# Patient Record
Sex: FEMALE | Race: WHITE | Employment: FULL TIME | URBAN - METROPOLITAN AREA
[De-identification: names, ages, dates, MRNs, and addresses within clinical notes are randomized per-mention and may not be internally consistent; named-entity substitution may affect disease eponyms.]

---

## 2023-03-17 ENCOUNTER — TRANSCRIBE ORDERS (OUTPATIENT)
Dept: SCHEDULING | Age: 53
End: 2023-03-17

## 2023-03-17 DIAGNOSIS — Z12.31 ENCOUNTER FOR SCREENING MAMMOGRAM FOR MALIGNANT NEOPLASM OF BREAST: Primary | ICD-10-CM

## 2023-03-20 ENCOUNTER — HOSPITAL ENCOUNTER (OUTPATIENT)
Dept: MAMMOGRAPHY | Age: 53
Discharge: HOME | End: 2023-03-23
Admitting: REGISTERED NURSE
Payer: COMMERCIAL

## 2023-03-20 DIAGNOSIS — Z12.31 ENCOUNTER FOR SCREENING MAMMOGRAM FOR MALIGNANT NEOPLASM OF BREAST: ICD-10-CM

## 2023-03-20 PROCEDURE — 77063 BREAST TOMOSYNTHESIS BI: CPT

## 2024-08-23 ENCOUNTER — OFFICE VISIT (OUTPATIENT)
Dept: ORTHOPEDIC SURGERY | Age: 54
End: 2024-08-23

## 2024-08-23 ENCOUNTER — TELEPHONE (OUTPATIENT)
Dept: ORTHOPEDIC SURGERY | Age: 54
End: 2024-08-23

## 2024-08-23 ENCOUNTER — HOSPITAL ENCOUNTER (INPATIENT)
Age: 54
LOS: 2 days | Discharge: HOME | DRG: 272 | End: 2024-08-25
Attending: FAMILY MEDICINE | Admitting: INTERNAL MEDICINE
Payer: COMMERCIAL

## 2024-08-23 ENCOUNTER — HOSPITAL ENCOUNTER (EMERGENCY)
Age: 54
Discharge: ANOTHER HOSPITAL | End: 2024-08-23
Attending: EMERGENCY MEDICINE
Payer: COMMERCIAL

## 2024-08-23 VITALS
HEIGHT: 67 IN | TEMPERATURE: 97.9 F | BODY MASS INDEX: 36.88 KG/M2 | HEART RATE: 88 BPM | OXYGEN SATURATION: 98 % | SYSTOLIC BLOOD PRESSURE: 184 MMHG | RESPIRATION RATE: 16 BRPM | WEIGHT: 235 LBS | DIASTOLIC BLOOD PRESSURE: 101 MMHG

## 2024-08-23 VITALS — BODY MASS INDEX: 36.88 KG/M2 | WEIGHT: 235 LBS | HEIGHT: 67 IN

## 2024-08-23 DIAGNOSIS — I82.431 ACUTE DEEP VEIN THROMBOSIS (DVT) OF POPLITEAL VEIN OF RIGHT LOWER EXTREMITY (HCC): ICD-10-CM

## 2024-08-23 DIAGNOSIS — M25.561 RIGHT KNEE PAIN, UNSPECIFIED CHRONICITY: Primary | ICD-10-CM

## 2024-08-23 DIAGNOSIS — R60.0 BILATERAL LEG EDEMA: ICD-10-CM

## 2024-08-23 DIAGNOSIS — I82.491 ACUTE DEEP VEIN THROMBOSIS (DVT) OF OTHER SPECIFIED VEIN OF RIGHT LOWER EXTREMITY (HCC): ICD-10-CM

## 2024-08-23 DIAGNOSIS — I82.411 ACUTE DEEP VEIN THROMBOSIS (DVT) OF FEMORAL VEIN OF RIGHT LOWER EXTREMITY (HCC): Primary | ICD-10-CM

## 2024-08-23 DIAGNOSIS — R60.0 LEG EDEMA, RIGHT: ICD-10-CM

## 2024-08-23 PROBLEM — N87.0 CERVICAL INTRAEPITHELIAL NEOPLASIA GRADE 1: Status: ACTIVE | Noted: 2023-06-22

## 2024-08-23 PROBLEM — R87.612 LOW GRADE SQUAMOUS INTRAEPITHELIAL LESION (LGSIL) ON PAPANICOLAOU SMEAR OF CERVIX: Status: ACTIVE | Noted: 2023-03-29

## 2024-08-23 PROBLEM — N92.1 EXCESSIVE MENSTRUATION WITH IRREGULAR CYCLE: Status: ACTIVE | Noted: 2023-06-20

## 2024-08-23 PROBLEM — E66.9 OBESITY: Status: ACTIVE | Noted: 2024-08-22

## 2024-08-23 PROBLEM — I82.413 ACUTE BILATERAL DEEP VEIN THROMBOSIS (DVT) OF FEMORAL VEINS (HCC): Status: ACTIVE | Noted: 2024-08-23

## 2024-08-23 PROBLEM — Z80.49 FAMILY HISTORY OF MALIGNANT NEOPLASM OF ENDOMETRIUM: Status: ACTIVE | Noted: 2023-06-20

## 2024-08-23 PROBLEM — R87.810 HIGH-RISK HUMAN PAPILLOMAVIRUS (HPV) DNA DETECTED IN CERVICAL SPECIMEN: Status: ACTIVE | Noted: 2023-04-30

## 2024-08-23 LAB
ALBUMIN SERPL-MCNC: 3.2 G/DL (ref 3.5–5)
ALBUMIN/GLOB SERPL: 0.9 (ref 1–1.9)
ALP SERPL-CCNC: 57 U/L (ref 35–104)
ALT SERPL-CCNC: 24 U/L (ref 12–65)
ANION GAP SERPL CALC-SCNC: 10 MMOL/L (ref 9–18)
APTT PPP: 29.8 SEC (ref 23.3–37.4)
AST SERPL-CCNC: 20 U/L (ref 15–37)
BASOPHILS # BLD: 0 K/UL (ref 0–0.2)
BASOPHILS NFR BLD: 1 % (ref 0–2)
BILIRUB SERPL-MCNC: 0.2 MG/DL (ref 0–1.2)
BUN SERPL-MCNC: 9 MG/DL (ref 6–23)
CALCIUM SERPL-MCNC: 8.9 MG/DL (ref 8.8–10.2)
CHLORIDE SERPL-SCNC: 102 MMOL/L (ref 98–107)
CO2 SERPL-SCNC: 27 MMOL/L (ref 20–28)
CREAT SERPL-MCNC: 0.82 MG/DL (ref 0.6–1.1)
DIFFERENTIAL METHOD BLD: NORMAL
EOSINOPHIL # BLD: 0.1 K/UL (ref 0–0.8)
EOSINOPHIL NFR BLD: 2 % (ref 0.5–7.8)
ERYTHROCYTE [DISTWIDTH] IN BLOOD BY AUTOMATED COUNT: 12.8 % (ref 11.9–14.6)
GLOBULIN SER CALC-MCNC: 3.7 G/DL (ref 2.3–3.5)
GLUCOSE SERPL-MCNC: 119 MG/DL (ref 70–99)
HCT VFR BLD AUTO: 37.4 % (ref 35.8–46.3)
HGB BLD-MCNC: 12.7 G/DL (ref 11.7–15.4)
IMM GRANULOCYTES # BLD AUTO: 0 K/UL (ref 0–0.5)
IMM GRANULOCYTES NFR BLD AUTO: 0 % (ref 0–5)
INR PPP: 1.1
LYMPHOCYTES # BLD: 1.8 K/UL (ref 0.5–4.6)
LYMPHOCYTES NFR BLD: 28 % (ref 13–44)
MCH RBC QN AUTO: 29.3 PG (ref 26.1–32.9)
MCHC RBC AUTO-ENTMCNC: 34 G/DL (ref 31.4–35)
MCV RBC AUTO: 86.4 FL (ref 82–102)
MONOCYTES # BLD: 0.4 K/UL (ref 0.1–1.3)
MONOCYTES NFR BLD: 7 % (ref 4–12)
NEUTS SEG # BLD: 4.1 K/UL (ref 1.7–8.2)
NEUTS SEG NFR BLD: 63 % (ref 43–78)
NRBC # BLD: 0 K/UL (ref 0–0.2)
PLATELET # BLD AUTO: 177 K/UL (ref 150–450)
PMV BLD AUTO: 10.7 FL (ref 9.4–12.3)
POTASSIUM SERPL-SCNC: 3.8 MMOL/L (ref 3.5–5.1)
PROT SERPL-MCNC: 6.8 G/DL (ref 6.3–8.2)
PROTHROMBIN TIME: 14.1 SEC (ref 11.3–14.9)
RBC # BLD AUTO: 4.33 M/UL (ref 4.05–5.2)
SODIUM SERPL-SCNC: 139 MMOL/L (ref 136–145)
UFH PPP CHRO-ACNC: 0.31 IU/ML (ref 0.3–0.7)
UFH PPP CHRO-ACNC: <0.1 IU/ML (ref 0.3–0.7)
WBC # BLD AUTO: 6.5 K/UL (ref 4.3–11.1)

## 2024-08-23 PROCEDURE — 6360000002 HC RX W HCPCS

## 2024-08-23 PROCEDURE — 36415 COLL VENOUS BLD VENIPUNCTURE: CPT

## 2024-08-23 PROCEDURE — 2580000003 HC RX 258: Performed by: INTERNAL MEDICINE

## 2024-08-23 PROCEDURE — 85520 HEPARIN ASSAY: CPT

## 2024-08-23 PROCEDURE — 96365 THER/PROPH/DIAG IV INF INIT: CPT

## 2024-08-23 PROCEDURE — 85730 THROMBOPLASTIN TIME PARTIAL: CPT

## 2024-08-23 PROCEDURE — 85025 COMPLETE CBC W/AUTO DIFF WBC: CPT

## 2024-08-23 PROCEDURE — 96366 THER/PROPH/DIAG IV INF ADDON: CPT

## 2024-08-23 PROCEDURE — 1100000000 HC RM PRIVATE

## 2024-08-23 PROCEDURE — 99285 EMERGENCY DEPT VISIT HI MDM: CPT

## 2024-08-23 PROCEDURE — 6360000002 HC RX W HCPCS: Performed by: INTERNAL MEDICINE

## 2024-08-23 PROCEDURE — 80053 COMPREHEN METABOLIC PANEL: CPT

## 2024-08-23 PROCEDURE — 6370000000 HC RX 637 (ALT 250 FOR IP): Performed by: PHYSICIAN ASSISTANT

## 2024-08-23 PROCEDURE — 85610 PROTHROMBIN TIME: CPT

## 2024-08-23 PROCEDURE — 96375 TX/PRO/DX INJ NEW DRUG ADDON: CPT

## 2024-08-23 RX ORDER — HEPARIN SODIUM 1000 [USP'U]/ML
80 INJECTION, SOLUTION INTRAVENOUS; SUBCUTANEOUS ONCE
Status: DISCONTINUED | OUTPATIENT
Start: 2024-08-23 | End: 2024-08-23

## 2024-08-23 RX ORDER — NORETHINDRONE AND ETHINYL ESTRADIOL 0.5-0.035
1 KIT ORAL DAILY
Status: ON HOLD | COMMUNITY
Start: 2024-07-10 | End: 2024-08-25 | Stop reason: HOSPADM

## 2024-08-23 RX ORDER — POTASSIUM CHLORIDE 7.45 MG/ML
10 INJECTION INTRAVENOUS PRN
Status: DISCONTINUED | OUTPATIENT
Start: 2024-08-23 | End: 2024-08-25 | Stop reason: HOSPADM

## 2024-08-23 RX ORDER — SODIUM CHLORIDE 0.9 % (FLUSH) 0.9 %
5-40 SYRINGE (ML) INJECTION EVERY 12 HOURS SCHEDULED
Status: DISCONTINUED | OUTPATIENT
Start: 2024-08-23 | End: 2024-08-25 | Stop reason: HOSPADM

## 2024-08-23 RX ORDER — ACETAMINOPHEN 650 MG/1
650 SUPPOSITORY RECTAL EVERY 6 HOURS PRN
Status: DISCONTINUED | OUTPATIENT
Start: 2024-08-23 | End: 2024-08-25 | Stop reason: HOSPADM

## 2024-08-23 RX ORDER — HEPARIN SODIUM 10000 [USP'U]/100ML
5-30 INJECTION, SOLUTION INTRAVENOUS CONTINUOUS
Status: DISCONTINUED | OUTPATIENT
Start: 2024-08-23 | End: 2024-08-25

## 2024-08-23 RX ORDER — HEPARIN SODIUM 10000 [USP'U]/100ML
5-30 INJECTION, SOLUTION INTRAVENOUS CONTINUOUS
Status: DISCONTINUED | OUTPATIENT
Start: 2024-08-23 | End: 2024-08-23 | Stop reason: HOSPADM

## 2024-08-23 RX ORDER — HEPARIN SODIUM 1000 [USP'U]/ML
80 INJECTION, SOLUTION INTRAVENOUS; SUBCUTANEOUS PRN
Status: DISCONTINUED | OUTPATIENT
Start: 2024-08-23 | End: 2024-08-23 | Stop reason: HOSPADM

## 2024-08-23 RX ORDER — HEPARIN SODIUM 1000 [USP'U]/ML
40 INJECTION, SOLUTION INTRAVENOUS; SUBCUTANEOUS PRN
Status: DISCONTINUED | OUTPATIENT
Start: 2024-08-23 | End: 2024-08-23 | Stop reason: HOSPADM

## 2024-08-23 RX ORDER — SODIUM CHLORIDE 9 MG/ML
INJECTION, SOLUTION INTRAVENOUS PRN
Status: DISCONTINUED | OUTPATIENT
Start: 2024-08-23 | End: 2024-08-25 | Stop reason: HOSPADM

## 2024-08-23 RX ORDER — ONDANSETRON 2 MG/ML
4 INJECTION INTRAMUSCULAR; INTRAVENOUS EVERY 6 HOURS PRN
Status: DISCONTINUED | OUTPATIENT
Start: 2024-08-23 | End: 2024-08-25 | Stop reason: HOSPADM

## 2024-08-23 RX ORDER — MELOXICAM 15 MG/1
15 TABLET ORAL DAILY
Qty: 30 TABLET | Refills: 2 | Status: ON HOLD | OUTPATIENT
Start: 2024-08-23 | End: 2024-08-24

## 2024-08-23 RX ORDER — ACETAMINOPHEN 325 MG/1
650 TABLET ORAL EVERY 6 HOURS PRN
Status: DISCONTINUED | OUTPATIENT
Start: 2024-08-23 | End: 2024-08-25 | Stop reason: HOSPADM

## 2024-08-23 RX ORDER — HYDROCHLOROTHIAZIDE 12.5 MG/1
12.5 TABLET ORAL DAILY
COMMUNITY

## 2024-08-23 RX ORDER — HYDRALAZINE HYDROCHLORIDE 20 MG/ML
10 INJECTION INTRAMUSCULAR; INTRAVENOUS EVERY 6 HOURS PRN
Status: DISCONTINUED | OUTPATIENT
Start: 2024-08-23 | End: 2024-08-25 | Stop reason: HOSPADM

## 2024-08-23 RX ORDER — HEPARIN SODIUM 1000 [USP'U]/ML
80 INJECTION, SOLUTION INTRAVENOUS; SUBCUTANEOUS ONCE
Status: COMPLETED | OUTPATIENT
Start: 2024-08-23 | End: 2024-08-23

## 2024-08-23 RX ORDER — LORAZEPAM 0.5 MG/1
0.5 TABLET ORAL EVERY 8 HOURS PRN
Status: DISCONTINUED | OUTPATIENT
Start: 2024-08-23 | End: 2024-08-25 | Stop reason: HOSPADM

## 2024-08-23 RX ORDER — OXYCODONE HYDROCHLORIDE 5 MG/1
5 TABLET ORAL EVERY 4 HOURS PRN
Status: DISCONTINUED | OUTPATIENT
Start: 2024-08-23 | End: 2024-08-23 | Stop reason: HOSPADM

## 2024-08-23 RX ORDER — HYDRALAZINE HYDROCHLORIDE 20 MG/ML
10 INJECTION INTRAMUSCULAR; INTRAVENOUS EVERY 6 HOURS PRN
Status: DISCONTINUED | OUTPATIENT
Start: 2024-08-23 | End: 2024-08-23 | Stop reason: HOSPADM

## 2024-08-23 RX ORDER — SODIUM CHLORIDE 9 MG/ML
INJECTION, SOLUTION INTRAVENOUS CONTINUOUS
Status: DISCONTINUED | OUTPATIENT
Start: 2024-08-23 | End: 2024-08-24

## 2024-08-23 RX ORDER — HEPARIN SODIUM 1000 [USP'U]/ML
80 INJECTION, SOLUTION INTRAVENOUS; SUBCUTANEOUS PRN
Status: DISCONTINUED | OUTPATIENT
Start: 2024-08-23 | End: 2024-08-25

## 2024-08-23 RX ORDER — ONDANSETRON 4 MG/1
4 TABLET, ORALLY DISINTEGRATING ORAL EVERY 8 HOURS PRN
Status: DISCONTINUED | OUTPATIENT
Start: 2024-08-23 | End: 2024-08-25 | Stop reason: HOSPADM

## 2024-08-23 RX ORDER — MAGNESIUM SULFATE IN WATER 40 MG/ML
2000 INJECTION, SOLUTION INTRAVENOUS PRN
Status: DISCONTINUED | OUTPATIENT
Start: 2024-08-23 | End: 2024-08-25 | Stop reason: HOSPADM

## 2024-08-23 RX ORDER — POLYETHYLENE GLYCOL 3350 17 G/17G
17 POWDER, FOR SOLUTION ORAL DAILY PRN
Status: DISCONTINUED | OUTPATIENT
Start: 2024-08-23 | End: 2024-08-25 | Stop reason: HOSPADM

## 2024-08-23 RX ORDER — SODIUM CHLORIDE 0.9 % (FLUSH) 0.9 %
5-40 SYRINGE (ML) INJECTION PRN
Status: DISCONTINUED | OUTPATIENT
Start: 2024-08-23 | End: 2024-08-25 | Stop reason: HOSPADM

## 2024-08-23 RX ORDER — HEPARIN SODIUM 1000 [USP'U]/ML
40 INJECTION, SOLUTION INTRAVENOUS; SUBCUTANEOUS PRN
Status: DISCONTINUED | OUTPATIENT
Start: 2024-08-23 | End: 2024-08-25

## 2024-08-23 RX ORDER — POTASSIUM CHLORIDE 1500 MG/1
40 TABLET, EXTENDED RELEASE ORAL PRN
Status: DISCONTINUED | OUTPATIENT
Start: 2024-08-23 | End: 2024-08-25 | Stop reason: HOSPADM

## 2024-08-23 RX ORDER — ACETAMINOPHEN 325 MG/1
650 TABLET ORAL EVERY 4 HOURS PRN
Status: DISCONTINUED | OUTPATIENT
Start: 2024-08-23 | End: 2024-08-23 | Stop reason: SDUPTHER

## 2024-08-23 RX ADMIN — SODIUM CHLORIDE, PRESERVATIVE FREE 10 ML: 5 INJECTION INTRAVENOUS at 20:12

## 2024-08-23 RX ADMIN — HEPARIN SODIUM 8500 UNITS: 1000 INJECTION INTRAVENOUS; SUBCUTANEOUS at 15:53

## 2024-08-23 RX ADMIN — HEPARIN SODIUM 18 UNITS/KG/HR: 10000 INJECTION, SOLUTION INTRAVENOUS at 15:52

## 2024-08-23 RX ADMIN — LORAZEPAM 0.5 MG: 0.5 TABLET ORAL at 23:19

## 2024-08-23 RX ADMIN — HYDRALAZINE HYDROCHLORIDE 10 MG: 20 INJECTION, SOLUTION INTRAMUSCULAR; INTRAVENOUS at 18:21

## 2024-08-23 ASSESSMENT — PAIN SCALES - GENERAL
PAINLEVEL_OUTOF10: 3
PAINLEVEL_OUTOF10: 0

## 2024-08-23 ASSESSMENT — PAIN DESCRIPTION - LOCATION: LOCATION: LEG

## 2024-08-23 ASSESSMENT — PAIN DESCRIPTION - ORIENTATION: ORIENTATION: RIGHT

## 2024-08-23 ASSESSMENT — PAIN - FUNCTIONAL ASSESSMENT: PAIN_FUNCTIONAL_ASSESSMENT: 0-10

## 2024-08-23 ASSESSMENT — LIFESTYLE VARIABLES
HOW OFTEN DO YOU HAVE A DRINK CONTAINING ALCOHOL: NEVER
HOW MANY STANDARD DRINKS CONTAINING ALCOHOL DO YOU HAVE ON A TYPICAL DAY: PATIENT DOES NOT DRINK

## 2024-08-23 NOTE — H&P
Hospitalist History and Physical   Admit Date:  No admission date for patient encounter.   Name:  Gricel Mtz   Age:  54 y.o.  Sex:  female  :  1970   MRN:  [id number]   Room:  [facility]    Presenting/Chief Complaint: No chief complaint on file.     Reason(s) for Admission: Acute bilateral deep vein thrombosis (DVT) of femoral veins (HCC) [I82.413]     History of Present Illness:   Gricel Mtz is a 54 y.o. female with no significant past medical history presenting after positive DVT study performed as an outpatient.  Patient states she was having some pain in her right calf for the past 4 days and has noted significant swelling in her right leg compared to left.  Emergency department discussed case with interventional radiology and patient plan for thrombectomy at about 10 AM tomorrow tentatively.  Patient placed on heparin drip at this time.  Upon talking with patient she states that her pain is fine and denies any exacerbation.  She does feel increased tightness around her knee specifically on the posterior aspect.  Patient denied any other complaints on exam.    Assessment & Plan:     Acute DVT  Patient with extensive lower extremity DVT on outpatient study  N.p.o. at midnight  IR planning for thrombectomy tomorrow morning  Continue heparin drip  PTT draw for dosing of heparin drip  Will check hemoglobin in the morning    Patient is critically ill.  Without the interventions noted, there is a high probability of imminent acute organ impairment or life-threatening deterioration.  Total critical care time spent: 32 minutes.    Critical care time includes time spent at bedside performing history and exam, performing chart review, discussing findings and treatment plan with patient and/or family, discussing patient with consultants and colleagues, ordering and reviewing pertinent laboratory and radiographic evaluations, and discussing patient with nursing staff. Time excludes procedures.       PT/OT evals  ondansetron (ZOFRAN-ODT) disintegrating tablet 4 mg     ondansetron (ZOFRAN) injection 4 mg    polyethylene glycol (GLYCOLAX) packet 17 g    OR Linked Order Group     acetaminophen (TYLENOL) tablet 650 mg     acetaminophen (TYLENOL) suppository 650 mg    0.9 % sodium chloride infusion    heparin (porcine) injection 8,500 Units    heparin (porcine) injection 8,500 Units    heparin (porcine) injection 4,300 Units    heparin 25,000 units in dextrose 5% 250 mL (premix) infusion       Prior to Admit Medications:  Current Outpatient Medications   Medication Instructions    meloxicam (MOBIC) 15 mg, Oral, DAILY    NORTREL 0.5/35, 28, 0.5-35 MG-MCG per tablet 1 tablet, Oral, DAILY       I have personally reviewed labs and tests:  Recent Results (from the past 24 hour(s))   CBC with Auto Differential    Collection Time: 08/23/24  2:26 PM   Result Value Ref Range    WBC 6.5 4.3 - 11.1 K/uL    RBC 4.33 4.05 - 5.2 M/uL    Hemoglobin 12.7 11.7 - 15.4 g/dL    Hematocrit 37.4 35.8 - 46.3 %    MCV 86.4 82.0 - 102.0 FL    MCH 29.3 26.1 - 32.9 PG    MCHC 34.0 31.4 - 35.0 g/dL    RDW 12.8 11.9 - 14.6 %    Platelets 177 150 - 450 K/uL    MPV 10.7 9.4 - 12.3 FL    nRBC 0.00 0.0 - 0.2 K/uL    Differential Type AUTOMATED      Neutrophils % 63 43 - 78 %    Lymphocytes % 28 13 - 44 %    Monocytes % 7 4.0 - 12.0 %    Eosinophils % 2 0.5 - 7.8 %    Basophils % 1 0.0 - 2.0 %    Immature Granulocytes % 0 0.0 - 5.0 %    Neutrophils Absolute 4.1 1.7 - 8.2 K/UL    Lymphocytes Absolute 1.8 0.5 - 4.6 K/UL    Monocytes Absolute 0.4 0.1 - 1.3 K/UL    Eosinophils Absolute 0.1 0.0 - 0.8 K/UL    Basophils Absolute 0.0 0.0 - 0.2 K/UL    Immature Granulocytes Absolute 0.0 0.0 - 0.5 K/UL   Comprehensive Metabolic Panel    Collection Time: 08/23/24  2:26 PM   Result Value Ref Range    Sodium 139 136 - 145 mmol/L    Potassium 3.8 3.5 - 5.1 mmol/L    Chloride 102 98 - 107 mmol/L    CO2 27 20 - 28 mmol/L    Anion Gap 10 9 - 18 mmol/L    Glucose 119 (H) 70 - 99

## 2024-08-23 NOTE — ED TRIAGE NOTES
Patient started with right leg pain behind knee on 8/20 confirmed with DVT today and was sent to ER. Patient with some edema present to right lower extremity as well. Patient denies any chest pain, shortness of breath or headache. Patient denies any further complaints.

## 2024-08-23 NOTE — TELEPHONE ENCOUNTER
Jodi w/ [employer] called, patient is POSITIVE FOR DVT LE FROM TOP TO BOTTOM, sent patient to the [facility] ER

## 2024-08-23 NOTE — ED PROVIDER NOTES
4.3 - 11.1 K/uL    RBC 4.33 4.05 - 5.2 M/uL    Hemoglobin 12.7 11.7 - 15.4 g/dL    Hematocrit 37.4 35.8 - 46.3 %    MCV 86.4 82.0 - 102.0 FL    MCH 29.3 26.1 - 32.9 PG    MCHC 34.0 31.4 - 35.0 g/dL    RDW 12.8 11.9 - 14.6 %    Platelets 177 150 - 450 K/uL    MPV 10.7 9.4 - 12.3 FL    nRBC 0.00 0.0 - 0.2 K/uL    Differential Type AUTOMATED      Neutrophils % 63 43 - 78 %    Lymphocytes % 28 13 - 44 %    Monocytes % 7 4.0 - 12.0 %    Eosinophils % 2 0.5 - 7.8 %    Basophils % 1 0.0 - 2.0 %    Immature Granulocytes % 0 0.0 - 5.0 %    Neutrophils Absolute 4.1 1.7 - 8.2 K/UL    Lymphocytes Absolute 1.8 0.5 - 4.6 K/UL    Monocytes Absolute 0.4 0.1 - 1.3 K/UL    Eosinophils Absolute 0.1 0.0 - 0.8 K/UL    Basophils Absolute 0.0 0.0 - 0.2 K/UL    Immature Granulocytes Absolute 0.0 0.0 - 0.5 K/UL   Comprehensive Metabolic Panel   Result Value Ref Range    Sodium 139 136 - 145 mmol/L    Potassium 3.8 3.5 - 5.1 mmol/L    Chloride 102 98 - 107 mmol/L    CO2 27 20 - 28 mmol/L    Anion Gap 10 9 - 18 mmol/L    Glucose 119 (H) 70 - 99 mg/dL    BUN 9 6 - 23 MG/DL    Creatinine 0.82 0.60 - 1.10 MG/DL    Est, Glom Filt Rate 84 >60 ml/min/1.73m2    Calcium 8.9 8.8 - 10.2 MG/DL    Total Bilirubin 0.2 0.0 - 1.2 MG/DL    ALT 24 12 - 65 U/L    AST 20 15 - 37 U/L    Alk Phosphatase 57 35 - 104 U/L    Total Protein 6.8 6.3 - 8.2 g/dL    Albumin 3.2 (L) 3.5 - 5.0 g/dL    Globulin 3.7 (H) 2.3 - 3.5 g/dL    Albumin/Globulin Ratio 0.9 (L) 1.0 - 1.9     Protime-INR   Result Value Ref Range    Protime 14.1 11.3 - 14.9 sec    INR 1.1     Results for orders placed or performed in visit on 08/23/24   Vascular duplex lower extremity venous right    Narrative    Right lower extremity venous ultrasound    INDICATION:  Pain and swelling,    COMPARISON:  None    Doppler ultrasound of the right lower extremity was performed.    FINDINGS:  There is no flow in the great saphenous, common femoral, femoral, and  popliteal veins. No compression or augmentation

## 2024-08-23 NOTE — ED NOTES
TRANSFER - OUT REPORT:    Verbal report given to Patrica adela Mtz  being transferred to [facility] for routine progression of patient care       Report consisted of patient's Situation, Background, Assessment and   Recommendations(SBAR).     Information from the following report(s) ED Encounter Summary was reviewed with the receiving nurse.    Lines:   Peripheral IV 08/23/24 Left Antecubital (Active)   Site Assessment Clean, dry & intact 08/23/24 1426   Line Status Blood return noted;Specimen collected;Flushed;Normal saline locked 08/23/24 1426   Phlebitis Assessment No symptoms 08/23/24 1426   Infiltration Assessment 0 08/23/24 1426        Opportunity for questions and clarification was provided.      Patient transported with:  [employer]

## 2024-08-23 NOTE — ED NOTES
Spoke with Laney With IR want pt transported  [place] IR procedure scheduled for @ 10 am ,pt to go to IR @ 0900  Her contact number is

## 2024-08-23 NOTE — PROGRESS NOTES
discussed care, but she is well versed on knee care and recovery  PT: She has instructions from 2018 left knee PT     Medication: OTC meds prn unless prohibited by other treating MD/DO/NP/PA: Prescribed:  Before taking any pill or using topical medication, I recommend the patient review all listed medication potential side effects via the Internet or pharmacy-provided handout to ensure patient safety and to understand and accept risks/complications of reported potential concerns.  I also recommend the patient review any concerns over medication sensitivities, reactions or medication compatibility with the pharmacist or if needed with primary care or internal medicine provider    Boswellia, Devil's claw, Turmeric-curcumin   Topical rub with CBD, Magnesium, Arnica, Frankincense, Myrrh  [Magne Hemp Max]  Mobic 15 mg 1 po q day; # 30; 2 refills     Surgical discussion: No indication today for surgery  Follow up: 3 weeks if no resolution  Work status: Regular     Addendum:  Stat DVT positive   ER admission  Heparin drip  Plan embolectomy   Since she will be on blood thinners, cancel Mobic     This note was created using Dragon voice recognition software which may result in errors of speech and spelling recognition and word/phrase syntax errors.

## 2024-08-24 ENCOUNTER — APPOINTMENT (OUTPATIENT)
Dept: INTERVENTIONAL RADIOLOGY/VASCULAR | Age: 54
DRG: 272 | End: 2024-08-24
Attending: FAMILY MEDICINE
Payer: COMMERCIAL

## 2024-08-24 PROBLEM — E66.9 OBESITY (BMI 30-39.9): Chronic | Status: ACTIVE | Noted: 2024-08-22

## 2024-08-24 PROBLEM — I82.4Y1 ACUTE DEEP VEIN THROMBOSIS (DVT) OF PROXIMAL END OF RIGHT LOWER EXTREMITY (HCC): Status: ACTIVE | Noted: 2024-08-23

## 2024-08-24 PROBLEM — I10 PRIMARY HYPERTENSION: Chronic | Status: ACTIVE | Noted: 2024-08-24

## 2024-08-24 LAB
UFH PPP CHRO-ACNC: 0.26 IU/ML (ref 0.3–0.7)
UFH PPP CHRO-ACNC: 0.36 IU/ML (ref 0.3–0.7)
UFH PPP CHRO-ACNC: 0.41 IU/ML (ref 0.3–0.7)

## 2024-08-24 PROCEDURE — 1100000000 HC RM PRIVATE

## 2024-08-24 PROCEDURE — 04CM3ZZ EXTIRPATION OF MATTER FROM RIGHT POPLITEAL ARTERY, PERCUTANEOUS APPROACH: ICD-10-PCS | Performed by: RADIOLOGY

## 2024-08-24 PROCEDURE — 6360000002 HC RX W HCPCS: Performed by: INTERNAL MEDICINE

## 2024-08-24 PROCEDURE — 04CN3ZZ EXTIRPATION OF MATTER FROM LEFT POPLITEAL ARTERY, PERCUTANEOUS APPROACH: ICD-10-PCS | Performed by: RADIOLOGY

## 2024-08-24 PROCEDURE — 85520 HEPARIN ASSAY: CPT

## 2024-08-24 PROCEDURE — B51C1ZZ FLUOROSCOPY OF LEFT LOWER EXTREMITY VEINS USING LOW OSMOLAR CONTRAST: ICD-10-PCS | Performed by: RADIOLOGY

## 2024-08-24 PROCEDURE — 2500000003 HC RX 250 WO HCPCS: Performed by: RADIOLOGY

## 2024-08-24 PROCEDURE — 99153 MOD SED SAME PHYS/QHP EA: CPT

## 2024-08-24 PROCEDURE — C1769 GUIDE WIRE: HCPCS

## 2024-08-24 PROCEDURE — 04CK3ZZ EXTIRPATION OF MATTER FROM RIGHT FEMORAL ARTERY, PERCUTANEOUS APPROACH: ICD-10-PCS | Performed by: RADIOLOGY

## 2024-08-24 PROCEDURE — 36415 COLL VENOUS BLD VENIPUNCTURE: CPT

## 2024-08-24 PROCEDURE — 37187 VENOUS MECH THROMBECTOMY: CPT

## 2024-08-24 PROCEDURE — 6360000002 HC RX W HCPCS: Performed by: RADIOLOGY

## 2024-08-24 PROCEDURE — 2580000003 HC RX 258: Performed by: INTERNAL MEDICINE

## 2024-08-24 PROCEDURE — 6360000004 HC RX CONTRAST MEDICATION: Performed by: RADIOLOGY

## 2024-08-24 RX ORDER — FENTANYL CITRATE 50 UG/ML
INJECTION, SOLUTION INTRAMUSCULAR; INTRAVENOUS PRN
Status: COMPLETED | OUTPATIENT
Start: 2024-08-24 | End: 2024-08-24

## 2024-08-24 RX ORDER — DIPHENHYDRAMINE HYDROCHLORIDE 50 MG/ML
INJECTION INTRAMUSCULAR; INTRAVENOUS PRN
Status: COMPLETED | OUTPATIENT
Start: 2024-08-24 | End: 2024-08-24

## 2024-08-24 RX ORDER — HYDROCHLOROTHIAZIDE 12.5 MG/1
12.5 CAPSULE ORAL DAILY
Status: DISCONTINUED | OUTPATIENT
Start: 2024-08-24 | End: 2024-08-25 | Stop reason: HOSPADM

## 2024-08-24 RX ORDER — LIDOCAINE HYDROCHLORIDE 20 MG/ML
INJECTION, SOLUTION INFILTRATION; PERINEURAL PRN
Status: COMPLETED | OUTPATIENT
Start: 2024-08-24 | End: 2024-08-24

## 2024-08-24 RX ORDER — IOPAMIDOL 612 MG/ML
INJECTION, SOLUTION INTRAVASCULAR PRN
Status: COMPLETED | OUTPATIENT
Start: 2024-08-24 | End: 2024-08-24

## 2024-08-24 RX ORDER — MIDAZOLAM HYDROCHLORIDE 1 MG/ML
INJECTION INTRAMUSCULAR; INTRAVENOUS PRN
Status: COMPLETED | OUTPATIENT
Start: 2024-08-24 | End: 2024-08-24

## 2024-08-24 RX ORDER — HYDRALAZINE HYDROCHLORIDE 20 MG/ML
INJECTION INTRAMUSCULAR; INTRAVENOUS PRN
Status: COMPLETED | OUTPATIENT
Start: 2024-08-24 | End: 2024-08-24

## 2024-08-24 RX ADMIN — LIDOCAINE HYDROCHLORIDE 5 ML: 20 INJECTION, SOLUTION INFILTRATION; PERINEURAL at 10:30

## 2024-08-24 RX ADMIN — SODIUM CHLORIDE: 9 INJECTION, SOLUTION INTRAVENOUS at 00:12

## 2024-08-24 RX ADMIN — MIDAZOLAM 1 MG: 1 INJECTION INTRAMUSCULAR; INTRAVENOUS at 10:26

## 2024-08-24 RX ADMIN — MIDAZOLAM 0.5 MG: 1 INJECTION INTRAMUSCULAR; INTRAVENOUS at 10:51

## 2024-08-24 RX ADMIN — SODIUM CHLORIDE, PRESERVATIVE FREE 10 ML: 5 INJECTION INTRAVENOUS at 21:21

## 2024-08-24 RX ADMIN — FENTANYL CITRATE 50 MCG: 50 INJECTION, SOLUTION INTRAMUSCULAR; INTRAVENOUS at 10:20

## 2024-08-24 RX ADMIN — HEPARIN SODIUM 18 UNITS/KG/HR: 10000 INJECTION, SOLUTION INTRAVENOUS at 05:49

## 2024-08-24 RX ADMIN — HEPARIN SODIUM 4300 UNITS: 1000 INJECTION INTRAVENOUS; SUBCUTANEOUS at 06:52

## 2024-08-24 RX ADMIN — DIPHENHYDRAMINE HYDROCHLORIDE 25 MG: 50 INJECTION, SOLUTION INTRAMUSCULAR; INTRAVENOUS at 10:26

## 2024-08-24 RX ADMIN — HEPARIN SODIUM 20 UNITS/KG/HR: 10000 INJECTION, SOLUTION INTRAVENOUS at 16:58

## 2024-08-24 RX ADMIN — ONDANSETRON 4 MG: 2 INJECTION INTRAMUSCULAR; INTRAVENOUS at 13:39

## 2024-08-24 RX ADMIN — FENTANYL CITRATE 50 MCG: 50 INJECTION, SOLUTION INTRAMUSCULAR; INTRAVENOUS at 10:36

## 2024-08-24 RX ADMIN — IOPAMIDOL 65 ML: 612 INJECTION, SOLUTION INTRAVENOUS at 10:58

## 2024-08-24 RX ADMIN — FENTANYL CITRATE 50 MCG: 50 INJECTION, SOLUTION INTRAMUSCULAR; INTRAVENOUS at 10:51

## 2024-08-24 RX ADMIN — FENTANYL CITRATE 50 MCG: 50 INJECTION, SOLUTION INTRAMUSCULAR; INTRAVENOUS at 10:26

## 2024-08-24 RX ADMIN — MIDAZOLAM 1 MG: 1 INJECTION INTRAMUSCULAR; INTRAVENOUS at 10:20

## 2024-08-24 RX ADMIN — MIDAZOLAM 1 MG: 1 INJECTION INTRAMUSCULAR; INTRAVENOUS at 10:36

## 2024-08-24 RX ADMIN — HYDRALAZINE HYDROCHLORIDE 10 MG: 20 INJECTION INTRAMUSCULAR; INTRAVENOUS at 11:19

## 2024-08-24 ASSESSMENT — PAIN SCALES - GENERAL
PAINLEVEL_OUTOF10: 0
PAINLEVEL_OUTOF10: 2
PAINLEVEL_OUTOF10: 0

## 2024-08-24 NOTE — PRE SEDATION
Sedation Pre-Procedure Note    Patient Name: Gricel Mtz   YOB: 1970  Room/Bed: [facility]  Medical Record Number: [id number]  Date: 8/24/2024   Time: 10:19 AM       Indication:  RLE DVT    Consent: I have discussed with the patient and/or the patient representative the indication, alternatives, and the possible risks and/or complications of the planned procedure and the anesthesia methods. The patient and/or patient representative appear to understand and agree to proceed.    Vital Signs:   Vitals:    08/24/24 0942   BP: (!) 183/81   Pulse: 82   Resp: 20   Temp: 99.1 °F (37.3 °C)   SpO2: 100%       Past Medical History:   has no past medical history on file.    Past Surgical History:   has no past surgical history on file.    Medications:   Scheduled Meds:    hydroCHLOROthiazide  12.5 mg Oral Daily    sodium chloride flush  5-40 mL IntraVENous 2 times per day     Continuous Infusions:    sodium chloride      sodium chloride 75 mL/hr at 08/24/24 0012    heparin (PORCINE) Infusion 20 Units/kg/hr (08/24/24 0653)     PRN Meds: sodium chloride flush, sodium chloride, potassium chloride **OR** potassium alternative oral replacement **OR** potassium chloride, magnesium sulfate, ondansetron **OR** ondansetron, polyethylene glycol, acetaminophen **OR** acetaminophen, heparin (porcine), heparin (porcine), hydrALAZINE, LORazepam  Home Meds:   Prior to Admission medications    Medication Sig Start Date End Date Taking? Authorizing Provider   NORTREL 0.5/35, 28, 0.5-35 MG-MCG per tablet Take 1 tablet by mouth daily 7/10/24  Yes Tiana Rowell MD   hydroCHLOROthiazide 12.5 MG tablet Take 1 tablet by mouth daily   Yes ProviderTiana MD     Coumadin Use Last 7 Days:  no  Antiplatelet drug therapy use last 7 days: no  Other anticoagulant use last 7 days: Heparin gtt    Pre-Sedation Documentation and Exam:   I have reviewed the patient's history and review of systems.    Mallampati Airway

## 2024-08-24 NOTE — OR NURSING
TRANSFER - OUT REPORT:           Verbal report given to SYDNI Rivera(name) on Gricel Mtz  being transferred to [facility](unit) for  routine progression of patient care            Report consisted of patient’s Situation, Background, Assessment and      Recommendations(SBAR).          Information from the following report(s) SBAR, Procedure Summary, and MAR was reviewed with the receiving nurse.       Opportunity for questions and clarification was provided.          Conscious Sedation:    200 Mcg of Fentanyl administered   3.5 Mg of Versed administered   25 Mg of Benadryl administered        Pt tolerated procedure well.      Peripheral Intravenous Line:   Peripheral IV 08/23/24 Left Antecubital (Active)   Site Assessment Clean, dry & intact 08/23/24 2227   Line Status Infusing 08/23/24 2227   Phlebitis Assessment No symptoms 08/23/24 2227   Infiltration Assessment 0 08/23/24 2227       Peripheral IV 08/24/24 Right Antecubital (Active)       VITALS:  BP (!) 178/81   Pulse 71   Temp 99.1 °F (37.3 °C) (Oral)   Resp 18   Ht 1.702 m (5' 7\")   Wt 107 kg (236 lb)   SpO2 97%   BMI 36.96 kg/m²

## 2024-08-24 NOTE — PROGRESS NOTES
4 Eyes Skin Assessment     NAME:  Gricel Mtz  YOB: 1970  MEDICAL RECORD NUMBER:  [id number]    The patient is being assessed for  Admission    I agree that at least one RN has performed a thorough Head to Toe Skin Assessment on the patient. ALL assessment sites listed below have been assessed.      Areas assessed by both nurses:    Head, Face, Ears,buttocks, back, arms, legs        Does the Patient have a Wound? No noted wound(s)       Cory Prevention initiated by RN: Yes  Wound Care Orders initiated by RN: Yes    Pressure Injury (Stage 3,4, Unstageable, DTI, NWPT, and Complex wounds) if present, place Wound referral order by RN under : No    New Ostomies, if present place, Ostomy referral order under : No     Nurse 1 eSignature: Electronically signed by POLA TAO RN on 8/23/24 at 10:35 PM EDT    **SHARE this note so that the co-signing nurse can place an eSignature**    Nurse 2 eSignature: {Esignature:[id number]}

## 2024-08-24 NOTE — PROGRESS NOTES
(BMI 30-39.9)    Primary hypertension  Resolved Problems:    * No resolved hospital problems. *      Objective:   Patient Vitals for the past 24 hrs:   Temp Pulse Resp BP SpO2   08/24/24 0320 98.1 °F (36.7 °C) 80 17 (!) 166/84 96 %   08/23/24 2332 98.2 °F (36.8 °C) 84 16 (!) 168/84 97 %   08/23/24 1956 99 °F (37.2 °C) 77 18 (!) 172/89 100 %       Oxygen Therapy  SpO2: 96 %  O2 Device: None (Room air)    Estimated body mass index is 36.96 kg/m² as calculated from the following:    Height as of this encounter: 1.702 m (5' 7\").    Weight as of this encounter: 107 kg (236 lb).    Intake/Output Summary (Last 24 hours) at 8/24/2024 0740  Last data filed at 8/23/2024 2012  Gross per 24 hour   Intake 10 ml   Output --   Net 10 ml         Physical Exam:   General:    Well nourished, seated in recliner, NAD.    Head:  Normocephalic, atraumatic  Eyes:  Sclerae appear normal.  Pupils equally round.  ENT:  Nares appear normal.  Moist oral mucosa  Neck:  No restricted ROM.  Trachea midline   CV:   RRR.  No m/r/g.  No jugular venous distension.  Trace right LE edema.  Lungs:   CTAB.  No wheezing, rhonchi, or rales.  Symmetric expansion.  Abdomen:   Obese, soft, nontender, nondistended.  Skin:     No rashes.  Normal coloration.   Warm and dry.    Neuro:  AAOx3.  CN II-XII grossly intact.    Psych:  Normal mood and affect.      I have personally reviewed labs and tests:  Recent Labs:  Recent Results (from the past 48 hour(s))   CBC with Auto Differential    Collection Time: 08/23/24  2:26 PM   Result Value Ref Range    WBC 6.5 4.3 - 11.1 K/uL    RBC 4.33 4.05 - 5.2 M/uL    Hemoglobin 12.7 11.7 - 15.4 g/dL    Hematocrit 37.4 35.8 - 46.3 %    MCV 86.4 82.0 - 102.0 FL    MCH 29.3 26.1 - 32.9 PG    MCHC 34.0 31.4 - 35.0 g/dL    RDW 12.8 11.9 - 14.6 %    Platelets 177 150 - 450 K/uL    MPV 10.7 9.4 - 12.3 FL    nRBC 0.00 0.0 - 0.2 K/uL    Differential Type AUTOMATED      Neutrophils % 63 43 - 78 %    Lymphocytes % 28 13 - 44 %     Monocytes % 7 4.0 - 12.0 %    Eosinophils % 2 0.5 - 7.8 %    Basophils % 1 0.0 - 2.0 %    Immature Granulocytes % 0 0.0 - 5.0 %    Neutrophils Absolute 4.1 1.7 - 8.2 K/UL    Lymphocytes Absolute 1.8 0.5 - 4.6 K/UL    Monocytes Absolute 0.4 0.1 - 1.3 K/UL    Eosinophils Absolute 0.1 0.0 - 0.8 K/UL    Basophils Absolute 0.0 0.0 - 0.2 K/UL    Immature Granulocytes Absolute 0.0 0.0 - 0.5 K/UL   Comprehensive Metabolic Panel    Collection Time: 08/23/24  2:26 PM   Result Value Ref Range    Sodium 139 136 - 145 mmol/L    Potassium 3.8 3.5 - 5.1 mmol/L    Chloride 102 98 - 107 mmol/L    CO2 27 20 - 28 mmol/L    Anion Gap 10 9 - 18 mmol/L    Glucose 119 (H) 70 - 99 mg/dL    BUN 9 6 - 23 MG/DL    Creatinine 0.82 0.60 - 1.10 MG/DL    Est, Glom Filt Rate 84 >60 ml/min/1.73m2    Calcium 8.9 8.8 - 10.2 MG/DL    Total Bilirubin 0.2 0.0 - 1.2 MG/DL    ALT 24 12 - 65 U/L    AST 20 15 - 37 U/L    Alk Phosphatase 57 35 - 104 U/L    Total Protein 6.8 6.3 - 8.2 g/dL    Albumin 3.2 (L) 3.5 - 5.0 g/dL    Globulin 3.7 (H) 2.3 - 3.5 g/dL    Albumin/Globulin Ratio 0.9 (L) 1.0 - 1.9     Protime-INR    Collection Time: 08/23/24  2:26 PM   Result Value Ref Range    Protime 14.1 11.3 - 14.9 sec    INR 1.1     APTT    Collection Time: 08/23/24  2:26 PM   Result Value Ref Range    APTT 29.8 23.3 - 37.4 SEC   Anti-Xa, Unfractionated Heparin    Collection Time: 08/23/24  2:26 PM   Result Value Ref Range    Anti-XA Unfrac Heparin <0.10 (L) 0.3 - 0.7 IU/mL   Anti-Xa, Unfractionated Heparin    Collection Time: 08/23/24 10:57 PM   Result Value Ref Range    Anti-XA Unfrac Heparin 0.31 0.3 - 0.7 IU/mL   Anti-Xa, Unfractionated Heparin    Collection Time: 08/24/24  6:10 AM   Result Value Ref Range    Anti-XA Unfrac Heparin 0.26 (L) 0.3 - 0.7 IU/mL       Current Meds:  Current Facility-Administered Medications   Medication Dose Route Frequency    sodium chloride flush 0.9 % injection 5-40 mL  5-40 mL IntraVENous 2 times per day    sodium chloride flush 0.9

## 2024-08-24 NOTE — OR NURSING
TRANSFER - OUT REPORT:           Verbal report given to SYDNI Davison(name) on Gricel Mtz  being transferred to [facility](unit) for  routine post-op            Report consisted of patient’s Situation, Background, Assessment and      Recommendations(SBAR).          Information from the following report(s) SBAR, Procedure Summary, and MAR was reviewed with the receiving nurse.       Opportunity for questions and clarification was provided.          Conscious Sedation:    200 Mcg of Fentanyl administered   3.5 Mg of Versed administered   25 Mg of Benadryl administered        Pt tolerated procedure well.      Peripheral Intravenous Line:   Peripheral IV 08/23/24 Left Antecubital (Active)   Site Assessment Clean, dry & intact 08/23/24 2227   Line Status Infusing 08/23/24 2227   Phlebitis Assessment No symptoms 08/23/24 2227   Infiltration Assessment 0 08/23/24 2227       Peripheral IV 08/24/24 Right Antecubital (Active)       VITALS:  BP (!) 178/81   Pulse 71   Temp 99.1 °F (37.3 °C) (Oral)   Resp 18   Ht 1.702 m (5' 7\")   Wt 107 kg (236 lb)   SpO2 97%   BMI 36.96 kg/m²

## 2024-08-25 VITALS
WEIGHT: 236.13 LBS | HEIGHT: 67 IN | DIASTOLIC BLOOD PRESSURE: 62 MMHG | RESPIRATION RATE: 16 BRPM | SYSTOLIC BLOOD PRESSURE: 156 MMHG | OXYGEN SATURATION: 99 % | BODY MASS INDEX: 37.06 KG/M2 | TEMPERATURE: 98.3 F

## 2024-08-25 LAB
ANION GAP SERPL CALC-SCNC: 10 MMOL/L (ref 9–18)
BUN SERPL-MCNC: 8 MG/DL (ref 6–23)
CALCIUM SERPL-MCNC: 8.5 MG/DL (ref 8.8–10.2)
CHLORIDE SERPL-SCNC: 105 MMOL/L (ref 98–107)
CO2 SERPL-SCNC: 23 MMOL/L (ref 20–28)
CREAT SERPL-MCNC: 0.61 MG/DL (ref 0.6–1.1)
ERYTHROCYTE [DISTWIDTH] IN BLOOD BY AUTOMATED COUNT: 12.9 % (ref 11.9–14.6)
GLUCOSE SERPL-MCNC: 119 MG/DL (ref 70–99)
HCT VFR BLD AUTO: 37.2 % (ref 35.8–46.3)
HGB BLD-MCNC: 12.4 G/DL (ref 11.7–15.4)
MCH RBC QN AUTO: 29.2 PG (ref 26.1–32.9)
MCHC RBC AUTO-ENTMCNC: 33.3 G/DL (ref 31.4–35)
MCV RBC AUTO: 87.7 FL (ref 82–102)
NRBC # BLD: 0 K/UL (ref 0–0.2)
PLATELET # BLD AUTO: 160 K/UL (ref 150–450)
PMV BLD AUTO: 11.3 FL (ref 9.4–12.3)
POTASSIUM SERPL-SCNC: 4.1 MMOL/L (ref 3.5–5.1)
RBC # BLD AUTO: 4.24 M/UL (ref 4.05–5.2)
SODIUM SERPL-SCNC: 138 MMOL/L (ref 136–145)
UFH PPP CHRO-ACNC: 0.33 IU/ML (ref 0.3–0.7)
WBC # BLD AUTO: 5.5 K/UL (ref 4.3–11.1)

## 2024-08-25 PROCEDURE — 85520 HEPARIN ASSAY: CPT

## 2024-08-25 PROCEDURE — 6370000000 HC RX 637 (ALT 250 FOR IP): Performed by: FAMILY MEDICINE

## 2024-08-25 PROCEDURE — 80048 BASIC METABOLIC PNL TOTAL CA: CPT

## 2024-08-25 PROCEDURE — 6360000002 HC RX W HCPCS: Performed by: INTERNAL MEDICINE

## 2024-08-25 PROCEDURE — 85027 COMPLETE CBC AUTOMATED: CPT

## 2024-08-25 PROCEDURE — 2580000003 HC RX 258: Performed by: INTERNAL MEDICINE

## 2024-08-25 PROCEDURE — 36415 COLL VENOUS BLD VENIPUNCTURE: CPT

## 2024-08-25 RX ADMIN — HYDROCHLOROTHIAZIDE 12.5 MG: 12.5 CAPSULE ORAL at 08:39

## 2024-08-25 RX ADMIN — RIVAROXABAN 15 MG: 15 TABLET, FILM COATED ORAL at 08:39

## 2024-08-25 RX ADMIN — HEPARIN SODIUM 20 UNITS/KG/HR: 10000 INJECTION, SOLUTION INTRAVENOUS at 04:49

## 2024-08-25 RX ADMIN — SODIUM CHLORIDE, PRESERVATIVE FREE 10 ML: 5 INJECTION INTRAVENOUS at 08:39

## 2024-08-25 NOTE — CARE COORDINATION
Pt was discharged prio to CM contact.  Chart reviewed. Pt was discharged home today with family and no needs/supportive care orders recieved for LMSW at this time.

## 2024-08-25 NOTE — PLAN OF CARE
Problem: Discharge Planning  Goal: Discharge to home or other facility with appropriate resources  8/25/2024 0907 by Leonora Richey, RN  Outcome: Completed  8/25/2024 0755 by Leonora Richey, RN  Outcome: Progressing     Problem: Pain  Goal: Verbalizes/displays adequate comfort level or baseline comfort level  8/25/2024 0907 by Leonora Richey, RN  Outcome: Completed  8/25/2024 0755 by Leonora Richey, RN  Outcome: Progressing  8/24/2024 6386 by Johanna Lombardo, RN  Outcome: Progressing

## 2024-08-25 NOTE — DISCHARGE SUMMARY
Hospitalist Discharge Summary   Admit Date:  2024  7:11 PM   DC Note date: 2024  Name:  Gricel Mtz   Age:  54 y.o.  Sex:  female  :  1970   MRN:  [id number]   Room:  [facility]  PCP:  Constanza Chin MD    Presenting Complaint: No chief complaint on file.     Initial Admission Diagnosis: Acute bilateral deep vein thrombosis (DVT) of femoral veins (HCC) [I82.413]     Problem List for this Hospitalization (present on admission):    Principal Problem:    Acute deep vein thrombosis (DVT) of proximal end of right lower extremity (HCC)  Active Problems:    Obesity (BMI 30-39.9)    Primary hypertension  Resolved Problems:    * No resolved hospital problems. *      Hospital Course:  Lorri Mtz is a 54 year old F with a PMHx obesity, HTN, and menopause recently started on hormonal therapy who presented to [facility] after findings of a positive LE ultrasound performed as an outpatient that she received after having R calf pain. She underwent thromboectomy with IR on  and was started on a heparin gtt and transitioned to Xarelto. Given this is a provoked and first occurrence of DVT recommendations for 3 months anticoagulation and outpatient follow up with her PCP. Warning signs discussed with patient, all questions answered, and she was discharged home in stable condition.    Disposition: Home  Diet: ADULT DIET; Regular  Code Status: Full Code    Follow Ups:  Follow-up Information       Follow up With Specialties Details Why Contact Info    Constanza Chin MD Obstetrics & Gynecology, Gynecology, Obstetrics Schedule an appointment as soon as possible for a visit in 2 week(s)  [address] [place] [address]  [phone]                  Time spent in patient discharge and coordination 30 minutes.      Follow up labs/diagnostics (ultimately defer to outpatient provider):  Defer to Follow-up Provider    Plan was discussed with Patient, Spouse, and Nurse .  All questions answered.  Patient was     Est, Glom Filt Rate >90 >60 ml/min/1.73m2    Calcium 8.5 (L) 8.8 - 10.2 MG/DL   Anti-Xa, Unfractionated Heparin    Collection Time: 08/25/24  4:12 AM   Result Value Ref Range    Anti-XA Unfrac Heparin 0.33 0.3 - 0.7 IU/mL       No results for input(s): \"COVID19\" in the last 72 hours.    Recent Vital Data:  Patient Vitals for the past 24 hrs:   Temp Pulse Resp BP SpO2   08/25/24 0900 98.3 °F (36.8 °C) (!) 0 16 (!) 156/62 99 %   08/25/24 0259 97.4 °F (36.3 °C) 69 17 (!) 145/68 97 %   08/24/24 2220 97.5 °F (36.4 °C) 72 17 (!) 148/65 97 %   08/24/24 1924 98.4 °F (36.9 °C) 77 18 (!) 153/69 97 %   08/24/24 1549 98.4 °F (36.9 °C) 73 19 (!) 142/67 100 %       Oxygen Therapy  SpO2: 99 %  Pulse via Oximetry: 81 beats per minute  O2 Device: None (Room air)  O2 Flow Rate (L/min): 4 L/min    Estimated body mass index is 36.98 kg/m² as calculated from the following:    Height as of this encounter: 1.702 m (5' 7\").    Weight as of this encounter: 107.1 kg (236 lb 2 oz).    Intake/Output Summary (Last 24 hours) at 8/25/2024 1204  Last data filed at 8/25/2024 0715  Gross per 24 hour   Intake 360 ml   Output --   Net 360 ml         Physical Exam:  General:    Well nourished.  No overt distress  Head:  Normocephalic, atraumatic  Eyes:  Sclerae appear normal.  Pupils equally round.    HENT:  Nares appear normal, no drainage.  Moist mucous membranes  Neck:  No restricted ROM.  Trachea midline  CV:   RRR.  No m/r/g.  No JVD  Lungs:   CTAB.  No wheezing, rhonchi, or rales.  Respirations even, unlabored  Abdomen:   Soft, nontender, nondistended.    Extremities: Warm and dry. RLE with mild 1+ pitting edema, R popliteal drsg CDI  Skin:     No rashes.  Normal coloration  Neuro:  CN II-XII grossly intact.  Psych:  Normal mood and affect.      Signed:  HUGO CASTILLO

## 2024-08-25 NOTE — PLAN OF CARE
Problem: Discharge Planning  Goal: Discharge to home or other facility with appropriate resources  Outcome: Progressing     Problem: Pain  Goal: Verbalizes/displays adequate comfort level or baseline comfort level  8/25/2024 0755 by Leonora Richey, RN  Outcome: Progressing  8/24/2024 9766 by Johanna Lombardo, RN  Outcome: Progressing

## 2024-08-27 ENCOUNTER — TELEPHONE (OUTPATIENT)
Dept: INTERVENTIONAL RADIOLOGY/VASCULAR | Age: 54
End: 2024-08-27

## 2024-08-27 NOTE — TELEPHONE ENCOUNTER
Patient had emergent right lower extremity thrombectomy 2 days ago by Dr. Fernandez.  She called today concerned that she still having quite a bit of pain in her right leg and difficulty walking.  She would like to be seen in follow-up.  I will have my office  contact her for an appointment this coming Thursday.  She does indicate the swelling has improved today compared to a few days ago.  She is taking her Xarelto as directed.

## 2024-08-27 NOTE — TRANSITION OF CARE
Reviewing chart per pt request for number to call for follow up, pt had concerns following IR thrombectomy, given IR number for follow up

## 2024-08-29 ENCOUNTER — OFFICE VISIT (OUTPATIENT)
Age: 54
End: 2024-08-29
Payer: COMMERCIAL

## 2024-08-29 VITALS
BODY MASS INDEX: 37.04 KG/M2 | WEIGHT: 236 LBS | DIASTOLIC BLOOD PRESSURE: 105 MMHG | SYSTOLIC BLOOD PRESSURE: 184 MMHG | HEIGHT: 67 IN | HEART RATE: 72 BPM

## 2024-08-29 DIAGNOSIS — I82.411 ACUTE DEEP VEIN THROMBOSIS (DVT) OF FEMORAL VEIN OF RIGHT LOWER EXTREMITY (HCC): Primary | ICD-10-CM

## 2024-08-29 PROCEDURE — 3077F SYST BP >= 140 MM HG: CPT | Performed by: PHYSICIAN ASSISTANT

## 2024-08-29 PROCEDURE — 3080F DIAST BP >= 90 MM HG: CPT | Performed by: PHYSICIAN ASSISTANT

## 2024-08-29 PROCEDURE — 99215 OFFICE O/P EST HI 40 MIN: CPT | Performed by: PHYSICIAN ASSISTANT

## 2024-08-29 NOTE — PATIENT INSTRUCTIONS
Once you have finished your high dose Xarelto switch the next morning to your new Eliquis.  We recommend you take Eliquis for 6 months.  Call us if you have worsening leg pain or swelling.  Wear your compression hose for at least 3 months during the daytime.

## 2024-09-02 NOTE — PROGRESS NOTES
[facility]  [address] [address]                            Progress Office Note    Gricel Mtz   1970 08/29/24   Self Referral    Subjective:     Chief Complaint   Patient presents with    Other     Follow up s/p Thrombectomy of acute right lower extremity DVT 8/24/24.      History of Present Illness:    Patient was admitted to [facility] from 8/23-8/25 for outpatient workup that revealed an extensive DVT.  US on 8/23 revealed DVT involving the right great saphenous, common femoral, femoral, and popliteal veins.  She underwent successful mechanical vein thrombectomy while inpatient on 8/24.  Patient wanted to be seen in follow-up as she wasn't sure how much swelling or discomfort she should expect post operatively.  She feels that swelling and pain have improved but not resolved.  Discomfort is primarily behind the right knee.  She is currently taking Xarelto.  Patient does not have compression hose.    She has not previously had a DVT but recently started hormone therapy for menopausal symptoms.  She had right leg swelling and discomfort for less than 2 weeks.   No known family hx of clotting disorders.     Objective:     Allergies:    Allergies   Allergen Reactions    Sulfa Antibiotics Other (See Comments)        Medical History:  No past medical history on file.     Family History:  No family history on file.     Surgical History:   Past Surgical History:   Procedure Laterality Date    IR THROMB MECH VEIN  8/24/2024    IR THROMB MECH VEIN 8/24/2024 [facility]        Social History:    Social History       Tobacco History       Smoking Status  Never      Smokeless Tobacco Use  Never              Alcohol History       Alcohol Use Status  Not Asked              Drug Use       Drug Use Status  Not Asked              Sexual Activity       Sexually Active  Not Asked                    Review of Systems:  Review of Systems   Musculoskeletal: